# Patient Record
(demographics unavailable — no encounter records)

---

## 2024-12-04 NOTE — DEVELOPMENTAL MILESTONES
[FreeTextEntry1] : [place] PRESCREENING DEVELOPMENTAL QUESTIONNAIRE REVIEWED. PASSED ALL AGE APPROPRIATE DEVELOPMENTAL  MILESTONES. [Passed] : passed [FreeTextEntry2] : 0

## 2024-12-04 NOTE — PHYSICAL EXAM
[Alert] : alert [Acute Distress] : no acute distress [Normocephalic] : normocephalic [Flat Open Anterior Destin] : flat open anterior fontanelle [Red Reflex] : red reflex bilateral [PERRL] : PERRL [Normally Placed Ears] : normally placed ears [Auricles Well Formed] : auricles well formed [Clear Tympanic membranes] : clear tympanic membranes [Light reflex present] : light reflex present [Bony landmarks visible] : bony landmarks visible [Discharge] : no discharge [Nares Patent] : nares patent [Palate Intact] : palate intact [Uvula Midline] : uvula midline [Tooth Eruption] : no tooth eruption [Supple, full passive range of motion] : supple, full passive range of motion [Palpable Masses] : no palpable masses [Symmetric Chest Rise] : symmetric chest rise [Clear to Auscultation Bilaterally] : clear to auscultation bilaterally [Regular Rate and Rhythm] : regular rate and rhythm [S1, S2 present] : S1, S2 present [Murmurs] : no murmurs [+2 Femoral Pulses] : (+) 2 femoral pulses [Soft] : soft [Tender] : nontender [Distended] : nondistended [Bowel Sounds] : bowel sounds present [Hepatomegaly] : no hepatomegaly [Splenomegaly] : no splenomegaly [Central Urethral Opening] : central urethral opening [Testicles Descended] : testicles descended bilaterally [Patent] : patent [Normally Placed] : normally placed [No Abnormal Lymph Nodes Palpated] : no abnormal lymph nodes palpated [Smith-Ortolani] : negative Smith-Ortolani [Allis Sign] : negative Allis sign [Symmetric Buttocks Creases] : symmetric buttocks creases [Spinal Dimple] : no spinal dimple [Tuft of Hair] : no tuft of hair [Plantar Grasp] : plantar grasp reflex present [Cranial Nerves Grossly Intact] : cranial nerves grossly intact [Rash or Lesions] : no rash/lesions

## 2024-12-04 NOTE — DISCUSSION/SUMMARY
[] : The components of the vaccine(s) to be administered today are listed in the plan of care. The disease(s) for which the vaccine(s) are intended to prevent and the risks have been discussed with the caretaker.  The risks are also included in the appropriate vaccination information statements which have been provided to the patient's caregiver.  The caregiver has given consent to vaccinate. [FreeTextEntry1] : Symptoms likely due to viral URI.  Recommend supportive care including antipyretics, fluids, nasal saline followed by nasal suction and use of humidifier. Discussed honey for cough if over age 1. Consider Mucinex for older kids. Return if symptoms worsen or persist.  mom deferred vaccines since pt gets fever every time f/u 1 week for vaccines

## 2025-01-14 NOTE — PHYSICAL EXAM
[TextEntry] : General: alert and active in no apparent distress Head: AFOF Eyes: conjunctiva clear, EOMI Ears:  cerumen impaction BL, after cerumen was removed via curette BL, TMs were noted to be translucent bilaterally, normal landmarks noted, normal canals Nose: +congestion, + clear rhinorrhea OP: no tonsillar enlargement/exudate, no lesions, no posterior pharyngeal erythema Neck: supple Lungs: clear to auscultation bilaterally, good air exchange, no retractions, comfortable WOB CVS: Normal rate, regular rhythm, no murmur Abdomen: soft, nondistended, nontender, and no hepatosplenomegaly or masses Skin: No rashes, lesions or skin changes

## 2025-01-14 NOTE — PLAN
[TextEntry] : + for RSV   - Discussed viral etiology and rationale for treatment - Flu negative in office today  - Maintain hydration via nursing - Symptomatic treatment with acetaminophen or ibuprofen prn - Saline nose drops, cool mist humidifier and nasal suction prn - Follow up if symptoms are worsening   GO TO THE HOSPITAL RIGHT AWAY IF YOUR CHILD IS EXPERIENCING ANY OF THE SYMPTOMS BELOW: - Difficulty breathing despite the treatment plan we've discussed - Gray or blue color to tongue, lips or skin - Not urinating in over 8 hours

## 2025-01-28 NOTE — DISCUSSION/SUMMARY
[FreeTextEntry1] : Discussed with parents preseptal cellulitis and treatment with oral antibiotics Mother was resistant to starting oral antibiotics. I strongly advised that oral antibiotic is recommended treatment but mother wishes to try antibiotic drops first Both Augmentin and Ofloxacin prescribed  If no improvement in 24 hours or worsening edema and erythema, mother agreed to start Augmentin Advised cool mist humidifier, nasal saline with nasal suction prn for URI symptoms Follow up if symptoms worsen or persist

## 2025-01-28 NOTE — HISTORY OF PRESENT ILLNESS
[de-identified] : right eye lid red, swollen with discharge x 1 day as per mom. Mom also states runny nose and congestion x 1 day. No other c/o.  [FreeTextEntry6] : Right eye upper eyelid swelling, redness and discharge x 1 day, has gotten better as day has gone on but was swollen shut with redness of upper eyelid this morning when he woke up Has runny nose and congestion x 1 day Afebrile No SOB, no difficulty breathing, tachypnea, retractions, wheeze, stridor No vomiting, diarrhea, rash Eating, drinking and urinating well Had RSV 2 weeks ago and symptoms had resolved

## 2025-01-28 NOTE — PHYSICAL EXAM
[Eyelid Swelling] : eyelid swelling [Right] : (right) [NL] : warm, clear [Conjunctival Injection] : no conjunctival injection [Erythema] : no erythema [Wheezing] : no wheezing [Rales] : no rales [Tachypnea] : no tachypnea [Rhonchi] : no rhonchi [FreeTextEntry5] : right upper eyelid with erythema and edema